# Patient Record
Sex: FEMALE | Race: WHITE | HISPANIC OR LATINO | URBAN - METROPOLITAN AREA
[De-identification: names, ages, dates, MRNs, and addresses within clinical notes are randomized per-mention and may not be internally consistent; named-entity substitution may affect disease eponyms.]

---

## 2020-07-10 ENCOUNTER — LAB OUTSIDE AN ENCOUNTER (OUTPATIENT)
Dept: URBAN - METROPOLITAN AREA CLINIC 40 | Facility: CLINIC | Age: 65
End: 2020-07-10

## 2020-07-10 ENCOUNTER — OFFICE VISIT (OUTPATIENT)
Dept: URBAN - METROPOLITAN AREA CLINIC 40 | Facility: CLINIC | Age: 65
End: 2020-07-10
Payer: MEDICARE

## 2020-07-10 ENCOUNTER — DASHBOARD ENCOUNTERS (OUTPATIENT)
Age: 65
End: 2020-07-10

## 2020-07-10 DIAGNOSIS — R11.0 NAUSEA: ICD-10-CM

## 2020-07-10 DIAGNOSIS — R10.13 ABDOMINAL PAIN, EPIGASTRIC: ICD-10-CM

## 2020-07-10 PROCEDURE — 99244 OFF/OP CNSLTJ NEW/EST MOD 40: CPT | Performed by: INTERNAL MEDICINE

## 2020-07-10 PROCEDURE — 99204 OFFICE O/P NEW MOD 45 MIN: CPT | Performed by: INTERNAL MEDICINE

## 2020-07-10 RX ORDER — PANTOPRAZOLE SODIUM 40 MG/1
1 TABLET TABLET, DELAYED RELEASE ORAL ONCE A DAY
Qty: 90 TABLET | Refills: 3 | OUTPATIENT
Start: 2020-07-10

## 2020-07-10 NOTE — HPI-TODAY'S VISIT:
Patient presents for evaluation of chronic abdominal pain in the epigastric region and nausea.  Symptoms have been ongoing for years.  Mainly a burning nausea sensation late at night or early in the morning and especially on an empty stomach.  She has a history of cholecystectomy and this pain is different.  She does not really throw up much.  Her weight is stable and she has good appetite otherwise.  She recently went to the emergency room for other reasons and at that time had normal labs and chest x-ray, specifically no anemia.  Her colonoscopy 5 years ago was reportedly normal.  She also gets occasional diarrhea after eating and left flank cramps and bloating pain.  No melena or hematemesis.  She does take a baby aspirin daily.  She also has congestive heart failure and a cardiac defibrillator.  She had a recently negative echocardiogram this week and was told everything looked good from a cardiac standpoint.  GI evaluation was recommended.

## 2020-07-16 ENCOUNTER — TELEPHONE ENCOUNTER (OUTPATIENT)
Dept: URBAN - METROPOLITAN AREA CLINIC 40 | Facility: CLINIC | Age: 65
End: 2020-07-16

## 2020-07-21 ENCOUNTER — TELEPHONE ENCOUNTER (OUTPATIENT)
Dept: URBAN - METROPOLITAN AREA CLINIC 40 | Facility: CLINIC | Age: 65
End: 2020-07-21

## 2020-07-23 ENCOUNTER — OFFICE VISIT (OUTPATIENT)
Dept: URBAN - METROPOLITAN AREA MEDICAL CENTER 9 | Facility: MEDICAL CENTER | Age: 65
End: 2020-07-23

## 2020-07-30 ENCOUNTER — TELEPHONE ENCOUNTER (OUTPATIENT)
Dept: URBAN - METROPOLITAN AREA CLINIC 40 | Facility: CLINIC | Age: 65
End: 2020-07-30

## 2020-08-03 ENCOUNTER — OFFICE VISIT (OUTPATIENT)
Dept: URBAN - METROPOLITAN AREA CLINIC 40 | Facility: CLINIC | Age: 65
End: 2020-08-03

## 2020-08-13 ENCOUNTER — OFFICE VISIT (OUTPATIENT)
Dept: URBAN - METROPOLITAN AREA MEDICAL CENTER 9 | Facility: MEDICAL CENTER | Age: 65
End: 2020-08-13
Payer: MEDICARE

## 2020-08-13 DIAGNOSIS — K29.60 ADENOPAPILLOMATOSIS GASTRICA: ICD-10-CM

## 2020-08-13 DIAGNOSIS — R10.84 ABDOMINAL CRAMPING, GENERALIZED: ICD-10-CM

## 2020-08-13 PROCEDURE — 43239 EGD BIOPSY SINGLE/MULTIPLE: CPT | Performed by: INTERNAL MEDICINE
